# Patient Record
Sex: MALE | Race: WHITE
[De-identification: names, ages, dates, MRNs, and addresses within clinical notes are randomized per-mention and may not be internally consistent; named-entity substitution may affect disease eponyms.]

---

## 2018-07-17 ENCOUNTER — HOSPITAL ENCOUNTER (INPATIENT)
Dept: HOSPITAL 25 - ED | Age: 58
LOS: 7 days | Discharge: HOME | DRG: 880 | End: 2018-07-24
Attending: PSYCHIATRY & NEUROLOGY | Admitting: PSYCHIATRY & NEUROLOGY
Payer: MEDICARE

## 2018-07-17 DIAGNOSIS — R45.851: ICD-10-CM

## 2018-07-17 DIAGNOSIS — M10.9: ICD-10-CM

## 2018-07-17 DIAGNOSIS — F17.200: ICD-10-CM

## 2018-07-17 DIAGNOSIS — Z63.4: ICD-10-CM

## 2018-07-17 DIAGNOSIS — F60.3: ICD-10-CM

## 2018-07-17 DIAGNOSIS — Z56.0: ICD-10-CM

## 2018-07-17 DIAGNOSIS — F32.9: ICD-10-CM

## 2018-07-17 DIAGNOSIS — Z81.8: ICD-10-CM

## 2018-07-17 DIAGNOSIS — Z91.5: ICD-10-CM

## 2018-07-17 DIAGNOSIS — F41.9: Primary | ICD-10-CM

## 2018-07-17 DIAGNOSIS — F11.23: ICD-10-CM

## 2018-07-17 DIAGNOSIS — R82.79: ICD-10-CM

## 2018-07-17 DIAGNOSIS — F39: ICD-10-CM

## 2018-07-17 DIAGNOSIS — Z72.89: ICD-10-CM

## 2018-07-17 DIAGNOSIS — R82.6: ICD-10-CM

## 2018-07-17 LAB
BASOPHILS # BLD AUTO: 0 10^3/UL (ref 0–0.2)
EOSINOPHIL # BLD AUTO: 0.2 10^3/UL (ref 0–0.6)
HCT VFR BLD AUTO: 34 % (ref 42–52)
HGB BLD-MCNC: 11.8 G/DL (ref 14–18)
LYMPHOCYTES # BLD AUTO: 1.1 10^3/UL (ref 1–4.8)
MCH RBC QN AUTO: 33 PG (ref 27–31)
MCHC RBC AUTO-ENTMCNC: 35 G/DL (ref 31–36)
MCV RBC AUTO: 95 FL (ref 80–94)
MONOCYTES # BLD AUTO: 0.4 10^3/UL (ref 0–0.8)
NEUTROPHILS # BLD AUTO: 2.6 10^3/UL (ref 1.5–7.7)
NRBC # BLD AUTO: 0 10^3/UL
NRBC BLD QL AUTO: 0
PLATELET # BLD AUTO: 200 10^3/UL (ref 150–450)
RBC # BLD AUTO: 3.59 10^6/UL (ref 4–5.4)
WBC # BLD AUTO: 4.3 10^3/UL (ref 3.5–10.8)

## 2018-07-17 PROCEDURE — 99238 HOSP IP/OBS DSCHRG MGMT 30/<: CPT

## 2018-07-17 PROCEDURE — 81015 MICROSCOPIC EXAM OF URINE: CPT

## 2018-07-17 PROCEDURE — 90853 GROUP PSYCHOTHERAPY: CPT

## 2018-07-17 PROCEDURE — 87186 SC STD MICRODIL/AGAR DIL: CPT

## 2018-07-17 PROCEDURE — 85025 COMPLETE CBC W/AUTO DIFF WBC: CPT

## 2018-07-17 PROCEDURE — 99231 SBSQ HOSP IP/OBS SF/LOW 25: CPT

## 2018-07-17 PROCEDURE — 36415 COLL VENOUS BLD VENIPUNCTURE: CPT

## 2018-07-17 PROCEDURE — 80307 DRUG TEST PRSMV CHEM ANLYZR: CPT

## 2018-07-17 PROCEDURE — 99222 1ST HOSP IP/OBS MODERATE 55: CPT

## 2018-07-17 PROCEDURE — 80053 COMPREHEN METABOLIC PANEL: CPT

## 2018-07-17 PROCEDURE — 93005 ELECTROCARDIOGRAM TRACING: CPT

## 2018-07-17 PROCEDURE — 99284 EMERGENCY DEPT VISIT MOD MDM: CPT

## 2018-07-17 PROCEDURE — 83690 ASSAY OF LIPASE: CPT

## 2018-07-17 PROCEDURE — 76705 ECHO EXAM OF ABDOMEN: CPT

## 2018-07-17 PROCEDURE — 87077 CULTURE AEROBIC IDENTIFY: CPT

## 2018-07-17 PROCEDURE — 87086 URINE CULTURE/COLONY COUNT: CPT

## 2018-07-17 PROCEDURE — 81003 URINALYSIS AUTO W/O SCOPE: CPT

## 2018-07-17 SDOH — SOCIAL STABILITY - SOCIAL INSECURITY: DISSAPEARANCE AND DEATH OF FAMILY MEMBER: Z63.4

## 2018-07-17 SDOH — ECONOMIC STABILITY - INCOME SECURITY: UNEMPLOYMENT, UNSPECIFIED: Z56.0

## 2018-07-17 NOTE — ED
Progress





- Progress Note


Progress Note: 





Asked by mental health unit to eval pt for left foot numbness and to read EKG, 

ordered for pt to be transferred.


Pt seen and examined on flex unit.


Pt sitting upright in no distress, complains of chronic back pain, and 

occasional left leg numbness to his toes.  Pt has no problem ambulating.  

Declines pain medication.  Also denies chest pain, SOB, HA, dizziness, 

abdominal pain, fever or incontinence.





PE: Pt alert, calm, conversant, no distress, c/o mild numbness left foot.


HEENT: NC/AT


Neck: supple


Cor S1S2


Lungs: clear


Abd soft nontender


Extrem moves all extrem well, no edema,no calf tenderness.


Neuro: A O x 3, Motor 5/5, Sensation intact to light touch. Gait normal. No 

focal deficit.  Cranial nerves II-XII intact. Pt is able to walk on heels and 

toes.  Reflexes intact. 











- Consult/PCP


Time Called: 09:30





Course/Dx





- Course


Course Of Treatment: : 18: Pt c/o left foot numbness and chronic back 

pain.  Pt with intact neuro exam.  No concern for radiculopathy, or any red 

flags for back pain.  Pt declines pain medication.  EK:08, 18: SR 75, 

nl AVIVCT, nl QTc, nl axis, no acute changes, no prior to compare.  Pt stable 

to be transferred at this time, 18 0215am.  Care to Dr. Cross at change of 

shift.





Discharge





- Sign-Out/Discharge


Documenting (check all that apply): Sign-Out Patient, Receiving Sign-Out


Signing out patient TO: Anthony Cross - at 0215, 18, pending transfer


Receiving patient FROM: Sanford Elliott





- Discharge Plan


Referrals: 


Bull Myrick MD [Primary Care Provider] -

## 2018-07-17 NOTE — ED
Altered Mental Status





- HPI Summary


HPI Summary: 





This is scribkandice Cote documenting for attending physician Sanford Cunningham M.D. Pt is a 59 y/o M w/ c/o panic attack. He reports being up all night 

yesterday and feeling "wired" today. Pt felt similar this past weekend as well 

and notes that Sx have been coming and going. He states he could not calm 

himself down, that he felt a skipping in his chest, and that he could not take 

a full breath of air. Pt claims he felt that he wanted to run, scream, and "

jump out of his own skin" tonight. Calf and finger numbness is reported by Pt 

as well. He believes current housing situation is contributing to stress, still 

feels anxious, and he wants to talk to MHE. On triage, presence of pain, SI, 

and HI are denied. Hx of panic attacks is noted by Pt in room. 








- History Of Current Complaint


Chief Complaint: EDMentalHealth


Stated Complaint: ANXIETY


Time Seen by Provider: 07/17/18 00:49


Onset/Duration: Still Present


Timing: Lasting Days - onset "this past weekend"


Severity Currently: None - pain is denied


Character: Agitation


Aggravating Factor(s): Environmental Exposure - living situation


Alleviating Factor(s): Nothing





- Allergies/Home Medications


Allergies/Adverse Reactions: 


 Allergies











Allergy/AdvReac Type Severity Reaction Status Date / Time


 


No Known Allergies Allergy   Verified 02/25/13 20:46











Home Medications: 


 Home Medications





Oxycodone HCl [Oxycodone HCl ER 30 mg] 30 mg PO TID PRN 07/17/18 [History 

Confirmed 07/17/18]


Levothyroxine TAB* [Synthroid 25 MCG TAB*] 25 mcg PO DAILY 07/18/18 [History 

Confirmed 07/18/18]











PMH/Surg Hx/FS Hx/Imm Hx


Endocrine/Hematology History: 


   Denies: Hx Anticoagulant Therapy


Cardiovascular History: 


   Denies: Hx Pacemaker/ICD


Sensory History: 


   Denies: Hx Hearing Aid


Psychiatric History: 


   Denies: Hx Panic Disorder





- Surgical History


Surgery Procedure, Year, and Place: HERNIA REPAIR, FINGER REATTACHED, SLEEP 

APNEA SURGERY 1-2 YRS AGO


Infectious Disease History: No


Infectious Disease History: 


   Denies: Traveled Outside the [place] in Last 30 Days





- Family History


Known Family History: 


   Negative: Blood Disorder





- Social History


Alcohol Use: Occasionally


Substance Use Type: Reports: Marijuana


Substance Use Comment - Amount & Last Used: occassionally


Smoking Status (MU): Heavy Every Day Tobacco Smoker





Review of Systems


Positive: Other - "felt skipping in chest"


Positive: Other - "cannot take full breath" 


Neurological: Other - calf and finger numbness 


Positive: Anxious, Other - agitation


All Other Systems Reviewed And Are Negative: Yes





Physical Exam





- Summary


Physical Exam Summary: 





Appearance: Well-appearing, Well-nourished, lying in bed comfortably


Skin: Warm, dry, no obvious rash


Eyes: sclera anicteric, no conjunctival pallor


ENT: mucous membranes moist, pharynx appears normal


Neck: Supple, nontender


Respiratory: Clear to auscultation, no signs of respiratory distress


Cardiovascular: Normal S1, S2. No murmurs. Normal distal pulses in tibial and 

radial bilaterally.


Abdomen: Soft, nontender, normal active bowel sounds present


Musculoskeletal: Normal, Strength/ROM Intact


Neurological: A&Ox3, awake and alert, mentation is normal, speech is fluent and 

appropriate


Psychiatric: affect is normal, does appear anxious but not depressed





Triage Information Reviewed: Yes


Vital Signs On Initial Exam: 


 Initial Vitals











Temp Pulse Resp BP Pulse Ox


 


 98.7 F   90   20   142/103   99 


 


 07/17/18 00:20  07/17/18 00:20  07/17/18 00:20  07/17/18 00:20  07/17/18 00:20











Vital Signs Reviewed: Yes





Diagnostics





- Vital Signs


 Vital Signs











  Temp Pulse Resp BP Pulse Ox


 


 07/17/18 00:37   82    95


 


 07/17/18 00:31   87    100


 


 07/17/18 00:20  98.7 F  90  20  142/103  99














- Laboratory


Lab Results: 


 Lab Results











  07/17/18 07/17/18 Range/Units





  01:17 01:18 


 


WBC   4.3  (3.5-10.8)  10^3/ul


 


RBC   3.59 L  (4.00-5.40)  10^6/ul


 


Hgb   11.8 L  (14.0-18.0)  g/dl


 


Hct   34 L  (42-52)  %


 


MCV   95 H  (80-94)  fL


 


MCH   33 H  (27-31)  pg


 


MCHC   35  (31-36)  g/dl


 


RDW   13  (10.5-15)  %


 


Plt Count   200  (150-450)  10^3/ul


 


MPV   8.8  (7.4-10.4)  um3


 


Neut % (Auto)   59.1  (38-83)  %


 


Lymph % (Auto)   25.0  (25-47)  %


 


Mono % (Auto)   9.7 H  (0-7)  %


 


Eos % (Auto)   5.1  (0-6)  %


 


Baso % (Auto)   1.1  (0-2)  %


 


Absolute Neuts (auto)   2.6  (1.5-7.7)  10^3/ul


 


Absolute Lymphs (auto)   1.1  (1.0-4.8)  10^3/ul


 


Absolute Monos (auto)   0.4  (0-0.8)  10^3/ul


 


Absolute Eos (auto)   0.2  (0-0.6)  10^3/ul


 


Absolute Basos (auto)   0  (0-0.2)  10^3/ul


 


Absolute Nucleated RBC   0  10^3/ul


 


Nucleated RBC %   0  


 


Sodium  140   (135-145)  mmol/L


 


Potassium  3.1 L   (3.5-5.0)  mmol/L


 


Chloride  104   (101-111)  mmol/L


 


Carbon Dioxide  29   (22-32)  mmol/L


 


Anion Gap  7   (2-11)  mmol/L


 


BUN  8   (6-24)  mg/dL


 


Creatinine  0.82   (0.67-1.17)  mg/dL


 


Est GFR ( Amer)  117.6   (>60)  


 


Est GFR (Non-Af Amer)  97.2   (>60)  


 


BUN/Creatinine Ratio  9.8   (8-20)  


 


Glucose  115 H   ()  mg/dL


 


Calcium  8.7   (8.6-10.3)  mg/dL


 


Total Bilirubin  0.30   (0.2-1.0)  mg/dL


 


AST  104 H   (13-39)  U/L


 


ALT  53 H   (7-52)  U/L


 


Alkaline Phosphatase  22 L   ()  U/L


 


Total Protein  6.1 L   (6.4-8.9)  g/dL


 


Albumin  3.8   (3.2-5.2)  g/dL


 


Globulin  2.3   (2-4)  g/dL


 


Albumin/Globulin Ratio  1.7   (1-3)  











Result Diagrams: 


 07/17/18 01:18





 07/17/18 01:17


Lab Statement: Any lab studies that have been ordered have been reviewed, and 

results considered in the medical decision making process.





Altered Mental Statu Course/Dx





- Diagnoses


Provider Diagnoses: 


 Anxiety








Discharge





- Sign-Out/Discharge


Documenting (check all that apply): Patient Departure





- Discharge Plan


Condition: Fair


Disposition: HOME





- Billing Disposition and Condition


Condition: FAIR


Disposition: Home

## 2018-07-17 NOTE — PN
ED Flex Patient Progress Note


Subjective:  


This is a 58 year-old M who is here voluntarily and pending  psychiatric 

evaluation secondary to _____ excess energy/anxiety recently__________.


Reports RUQ pain past 3 days. Denies associated nausea, vomiting, diarrhea and 

no pain w/ eating. LFT's are elevated (AST > ALT). Denies heavy ETOH use and no 

h/o liver/gallbladder issues. Does have h/o gout since 23 y.o.





 





Objective: 


Vitals: Most recent vital signs documented below. 


General: NAD, Alert and oriented x3


HEENT: mucosa moist, sclera anicteric


Heart: S1/S2, rrr


Lungs: CTA or with rales, rhonchi, wheezing


AB: + BS, soft, RUQ TTP - no rebounding


Integ: anicteric


PSYCH: calm, pleasant, cooperative


Laboratory: Current laboratory results documented below. 


 








Assessment: 


1) Increased energy/anxiety


2) RUQ pain w/ elevated LFT's


 








Plan: 


1) Pending voluntary psychiatric evaluation. Will follow up daily __while in ED_

__. 


2) U/S and U/A ordered - pt agrees w/ plan





 Vital Signs











Temp Pulse Resp BP Pulse Ox


 


 98.7 F   84   20   142/103   94 


 


 07/17/18 00:20  07/17/18 01:00  07/17/18 00:20  07/17/18 00:20  07/17/18 01:00











 Lab Results - Entire Visit











  07/17/18 07/17/18





  01:18 01:17


 


WBC  4.3 


 


RBC  3.59 L 


 


Hgb  11.8 L 


 


Hct  34 L 


 


MCV  95 H 


 


MCH  33 H 


 


MCHC  35 


 


RDW  13 


 


Plt Count  200 


 


MPV  8.8 


 


Neut % (Auto)  59.1 


 


Lymph % (Auto)  25.0 


 


Mono % (Auto)  9.7 H 


 


Eos % (Auto)  5.1 


 


Baso % (Auto)  1.1 


 


Absolute Neuts (auto)  2.6 


 


Absolute Lymphs (auto)  1.1 


 


Absolute Monos (auto)  0.4 


 


Absolute Eos (auto)  0.2 


 


Absolute Basos (auto)  0 


 


Absolute Nucleated RBC  0 


 


Nucleated RBC %  0 


 


Sodium   140


 


Potassium   3.1 L


 


Chloride   104


 


Carbon Dioxide   29


 


Anion Gap   7


 


BUN   8


 


Creatinine   0.82


 


Est GFR ( Amer)   117.6


 


Est GFR (Non-Af Amer)   97.2


 


BUN/Creatinine Ratio   9.8


 


Glucose   115 H


 


Calcium   8.7


 


Total Bilirubin   0.30


 


AST   104 H


 


ALT   53 H


 


Alkaline Phosphatase   22 L


 


Total Protein   6.1 L


 


Albumin   3.8


 


Globulin   2.3


 


Albumin/Globulin Ratio   1.7

## 2018-07-17 NOTE — RAD
Indication: Right upper quadrant pain, elevated liver enzymes.



Real-time sonography of the right upper quadrant was performed.



The liver is is enlarged measuring up to 20.3 cm in length. No focal lesions or

intrahepatic duct dilatation is noted. The common duct measures up to 0.9 cm in the

extrahepatic and extra pancreatic duct. This is borderline abnormal. The distal common

duct is not visualized. The gallbladder demonstrates no gallstones, pericholecystic fluid

or wall thickening. The pancreas demonstrates no focal mass or pancreatic duct dilatation.



The right kidney measures 12.7 x 5.6 x 5.6 cm without hydronephrosis. Aorta is normal in

size. Inferior vena cava is unremarkable.



IMPRESSION: No evidence of cholelithiasis. The common duct measures up to 0.9 cm which is

borderline abnormal. No intrahepatic duct dictation is noted. Enlarged liver.

## 2018-07-18 LAB — WBC UR QL AUTO: (no result)

## 2018-07-18 RX ADMIN — LEVOTHYROXINE SODIUM SCH MCG: 25 TABLET ORAL at 14:48

## 2018-07-18 RX ADMIN — ALLOPURINOL SCH MG: 100 TABLET ORAL at 21:20

## 2018-07-18 RX ADMIN — WATER SCH: 100 INJECTION, SOLUTION INTRAVENOUS at 21:21

## 2018-07-18 NOTE — PN
Subjective





- Subjective


Service Type: 97705 Hosp care 15 min low complexity


Subjective: 


patient continues to endorse desire for admission to [facility].  he states he "needs 

help" and that he had a positive experience when here 18 years ago.  writer 

notified him of likely bed availability at [facility] [place] [facility] later today. 





Objective





- Appearance


Appearance: Well Developed/Nourished


Dysmorphic Features: Yes


Hygiene: Normal


Grooming: Fairly Well Kept





- Behavior


Psychomotor Activities: Normal


Exhibits Abnormal Movement: No





- Attitude and Relatedness


Attitude and Relatedness: Cooperative


Eye Contact: Good





- Speech


Quality: Unpressured


Latencies: Normal


Quantity: Copious





- Mood


Patient's Decription of Mood: "Anxious"





- Affect


Observed Affect: Tense


Affect Consistent with: Dysphoria





- Thought Process


Patient's Thought Process: Coherent, Over Inclusive


Thought Content: Yes Passive Death Wish, Yes Suicidal Planning, No Homicidal 

Ideation, No Paranoid Ideation





- Sensorium


Experiencing Hallucinations: No, Sensorium is Clear


Type of Hallucinations: Visual: No, Auditory: No, Command: No





- Level of Consciousness


Level of Consciousness: Alert


Orientation: Yes Intact, Yes Orientated to Time, Yes Orientated to Place, Yes 

Orientated to Person





- Impulse Control


Impulse Control: Tenuous





- Insight and Judgement


Insight and Judgement: Poor





Assessment





- Assessment


Inpatient DSM-V Dx: F41.9


Clinical Impression: 





59yo white male, domiciled, who presented to ED with c/o panic and suicidal 

ideation.  He reports desire to stop opioid pain medications and requests 

admission to [facility].  





Plan





- Plan


Treatment Plan: 


Name: MERON CARUSO                        


YOB: 1960                        


[id number]


[id number]





voluntary admission to [facility] pending bed availability.

## 2018-07-18 NOTE — PN
Subjective





- Subjective


Date of Service: 07/17/18


Service Type: 82164 Hosp care 15 min low complexity


Subjective: 


patient continues to endorse anxiety and suicidal ideation.  he understands the 

mental health unit is full and staff is attempting to identify an accepting 

hospital. 





Objective





- Appearance


Appearance: Well Developed/Nourished


Dysmorphic Features: Yes


Hygiene: Normal


Grooming: Fairly Well Kept





- Behavior


Psychomotor Activities: Normal


Exhibits Abnormal Movement: No





- Attitude and Relatedness


Attitude and Relatedness: Cooperative


Eye Contact: Fair





- Speech


Quality: Unpressured


Latencies: Normal


Quantity: Appropriate





- Mood


Patient's Decription of Mood: "Sad"





- Affect


Observed Affect: Depressed


Affect Consistent with: Dysphoria





- Thought Process


Patient's Thought Process: Over Inclusive


Thought Content: Yes Passive Death Wish, Yes Suicidal Planning, No Homicidal 

Ideation, No Paranoid Ideation





- Sensorium


Experiencing Hallucinations: No, Sensorium is Clear


Type of Hallucinations: Visual: No, Auditory: No, Command: No





- Level of Consciousness


Level of Consciousness: Alert


Orientation: Yes Intact, Yes Orientated to Time, Yes Orientated to Place, Yes 

Orientated to Person





- Impulse Control


Impulse Control: Poor





- Insight and Judgement


Insight and Judgement: Poor





Assessment





- Assessment


Merits Inpatient Hospitalization: For Immediate Safety, For Stabilization, To 

Initiate Treatment


Inpatient DSM-V Dx: F41.9


Clinical Impression: 





57yo white male, domiciled, who presented to ED with c/o panic and suicidal 

ideation.  He reports desire to stop opioid pain medications and requests 

admission to [facility].  





Plan





- Plan


Treatment Plan: 


Name: MERON CARUSO                        


YOB: 1960                        


[id number]


[id number]





voluntary admission to [facility].  unit is full and pending transfer to accepting 

facility.

## 2018-07-18 NOTE — ED
Progress





- Progress Note


Progress Note: 





Asked by mental health unit to eval pt for left foot numbness and to read EKG, 

ordered for pt to be transferred.


Pt seen and examined on flex unit.


Pt sitting upright in no distress, complains of chronic back pain, and 

occasional left leg numbness to his toes.  Pt has no problem ambulating.  

Declines pain medication.  Also denies chest pain, SOB, HA, dizziness, 

abdominal pain, fever or incontinence.





PE: Pt alert, calm, conversant, no distress, c/o mild numbness left foot.


HEENT: NC/AT


Neck: supple


Cor S1S2


Lungs: clear


Abd soft nontender


Extrem moves all extrem well, no edema,no calf tenderness.


Neuro: A O x 3, Motor 5/5, Sensation intact to light touch. Gait normal. No 

focal deficit.  Cranial nerves II-XII intact. Pt is able to walk on heels and 

toes.  Reflexes intact. 











- Consult/PCP


Time Called: 09:30





Course/Dx





- Course


Course Of Treatment: 213: 18: Pt c/o left foot numbness and chronic back 

pain.  Pt with intact neuro exam.  No concern for radiculopathy, or any red 

flags for back pain.  Pt declines pain medication.  EK:08, 18: SR 75, 

nl AVIVCT, nl QTc, nl axis, no acute changes, no prior to compare.  Pt stable 

to be transferred at this time, 18 0215am.  Care to Dr. Cross at change of 

shift.





- Diagnoses


Provider Diagnoses: 


 Anxiety








Discharge





- Sign-Out/Discharge


Documenting (check all that apply): Patient Departure - admit





- Discharge Plan


Condition: Fair


Disposition: ADMITTED TO [facility]


Referrals: 


Bull Myrick MD [Primary Care Provider] - 





- Billing Disposition and Condition


Condition: FAIR


Disposition: Admitted to [facility]

## 2018-07-19 RX ADMIN — THERA TABS SCH TAB: TAB at 08:52

## 2018-07-19 RX ADMIN — ALLOPURINOL SCH MG: 100 TABLET ORAL at 08:53

## 2018-07-19 RX ADMIN — WATER SCH: 100 INJECTION, SOLUTION INTRAVENOUS at 19:35

## 2018-07-19 RX ADMIN — ALLOPURINOL SCH MG: 100 TABLET ORAL at 21:39

## 2018-07-19 RX ADMIN — ACETAMINOPHEN PRN MG: 325 TABLET ORAL at 00:25

## 2018-07-19 RX ADMIN — LEVOTHYROXINE SODIUM SCH MCG: 25 TABLET ORAL at 07:21

## 2018-07-19 RX ADMIN — NICOTINE SCH: 21 PATCH TRANSDERMAL at 08:53

## 2018-07-19 NOTE — PN
MHU: Group Therapy Note





- Service Type


Service Type: 80058 Group Psychotherapy - Cognitive Behavioral Group Therapy (

CBT):Patient was attentive and participatory in CBT programming this morning, 

and remained in good behavioral control.  Patient expressed positive insights 

regarding relevant treatment interventions and goals.
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
## 2018-07-20 RX ADMIN — ALLOPURINOL SCH MG: 100 TABLET ORAL at 20:10

## 2018-07-20 RX ADMIN — LEVOTHYROXINE SODIUM SCH MCG: 25 TABLET ORAL at 08:45

## 2018-07-20 RX ADMIN — MIRTAZAPINE SCH MG: 15 TABLET, FILM COATED ORAL at 21:41

## 2018-07-20 RX ADMIN — IBUPROFEN PRN MG: 400 TABLET ORAL at 15:38

## 2018-07-20 RX ADMIN — BACITRACIN SCH: 500 OINTMENT TOPICAL at 20:09

## 2018-07-20 RX ADMIN — ALLOPURINOL SCH MG: 100 TABLET ORAL at 08:45

## 2018-07-20 RX ADMIN — WATER SCH: 100 INJECTION, SOLUTION INTRAVENOUS at 20:10

## 2018-07-20 RX ADMIN — NICOTINE SCH: 21 PATCH TRANSDERMAL at 08:45

## 2018-07-20 RX ADMIN — THERA TABS SCH TAB: TAB at 08:45

## 2018-07-20 RX ADMIN — ACETAMINOPHEN PRN MG: 325 TABLET ORAL at 08:59

## 2018-07-20 NOTE — PN
Subjective





- Subjective


Date of Service: 07/20/18


Service Type: [address] [facility] care 15 min low complexity


Subjective: 





Patient was seen by self, discussed with treatment team, chart was reviewed. 

Patient has been compliant with his medications, no reported side effects. 

Patient reported that finally he was mechelle to sleep some better last night, 

continue to report depressive symptoms but working with therapy for his 

symptoms. Patient sleeping has been better than last few days. Patient eating 

has been fair on the unit. Patient has been cooperative with staff. Patient 

behavior has been in control and participating in the milieu. Patient mood was 

less anxious reports some improvement in his feelings and negative thoughts. 

Patient has been reporting no suicidal or homicidal ideation on the unit. No 

psychotic symptoms of delusions or hallucinations.





Objective





- Appearance


Appearance: Healthy Appearing


Dysmorphic Features: No


Hygiene: Normal


Grooming: Fairly Well Kept





- Behavior


Psychomotor Activities: Normal


Exhibits Abnormal Movement: No





- Attitude and Relatedness


Attitude and Relatedness: Cooperative


Eye Contact: Fair





- Speech


Quality: Unpressured


Latencies: Normal


Quantity: Appropriate





- Mood


Patient's Decription of Mood: "better"





- Affect


Observed Affect: Fair


Affect Consistent with: Dysphoria - less





- Thought Process


Patient's Thought Process: Coherent


Thought Content: No Passive Death Wish, No Suicidal Planning, No Homicidal 

Ideation, No Paranoid Ideation





- Sensorium


Experiencing Hallucinations: No, Sensorium is Clear


Type of Hallucinations: Visual: No, Auditory: No, Command: No





- Level of Consciousness


Level of Consciousness: Alert


Orientation: Yes Intact, Yes Orientated to Time, Yes Orientated to Place, Yes 

Orientated to Person





- Impulse Control


Impulse Control: Intact





- Insight and Judgement


Insight and Judgement: Fair





- Group Participation


Particating in Group Activities: Yes





- Medication Management


Medication Management Adherence: Yes





Assessment





- Assessment


Inpatient DSM-V Dx: F41.9


Clinical Impression: 





Patient with history of mood disorder unspecified, substance/acohol use disorder

, borderline personality disorder. Patient currently admitted due to worsening 

of depression and suicidal ideation. Patient has also been struggling with 

relationship issues and feels lonely as recent losses of family and friends. 

Patient is a danger to self, discharged hence medications are being adjusted 

and symptoms will be stabilized on inpatient.





Plan





- Plan


Treatment Plan: 


Name: MERON CARUSO                        


YOB: 1960                        


[id number]


[id number]








- Patient continues to be hospitalized due to recent suicidal thoughts with plan

, mood instability, anxiety and impulsivity.


- Patient's medications were adjusted after informed consent with increment in 

Remeron to 15 mg HS. Will continue with Benadryl and clonidine prn for anxiety 

and opioid withdrawal respectively.


- Patient will be monitored for improvement and side effects. Risk and benefits 

were discussed.


- Patient was encouraged to continue his participation in the milieu, group and 

individual therapy


Medications: 


 Current Medications





Al Hydrox/Mg Hydrox/Simethicone (Maalox Plus*)  30 ml PO Q4H PRN


   PRN Reason: INDIGESTION


Allopurinol (Zyloprim Tab*)  100 mg PO BID [facility]


   Last Admin: 07/20/18 08:45 Dose:  100 mg


Clonidine HCl (Catapres Tab*)  0.1 mg PO Q6H PRN


   PRN Reason: WITHDRAWAL - OPIATE


Colchicine (Colcrys*)  0.6 mg PO BID PRN


   PRN Reason: PAIN


Diphenhydramine HCl (Benadryl Po*)  25 mg PO Q4H PRN


   PRN Reason: ITCHING


   Last Admin: 07/19/18 00:25 Dose:  25 mg


Hydrocortisone (Hytone Cream 1%*)  1 applic TOPICAL TID PRN


   PRN Reason: ITCHING


   Last Admin: 07/18/18 21:22 Dose:  1 applic


Ibuprofen (Motrin Tab*)  400 mg PO Q6H PRN


   PRN Reason: PAIN


Levothyroxine Sodium (Synthroid Tab*)  25 mcg PO DAILY@0600 [facility]


   Last Admin: 07/20/18 08:45 Dose:  25 mcg


Mirtazapine (Remeron Tab*)  15 mg PO BEDTIME [facility]


Multivitamins (Theragran Tab*)  1 tab PO DAILY [facility]


   Last Admin: 07/20/18 08:45 Dose:  1 tab


Nicotine (Nicotine Inhaler*)  10 mg INH Q2H PRN


   PRN Reason: CRAVING


Nicotine (Nicotine Patch 21 Mg/24 Hr*)  1 patch TRANSDERM DAILY@0800 [facility]


   Last Admin: 07/20/18 08:45 Dose:  Not Given


Nicotine Polacrilex (Nicotine Gum*)  2 mg PO Q2H PRN


   PRN Reason: CRAVING


Pharmacy Profile Note (Nicotine Patch Removal Note*)  1 note PATCH OFF 2100 [facility]


   Last Admin: 07/19/18 19:35 Dose:  Not Given

## 2018-07-21 RX ADMIN — WATER SCH: 100 INJECTION, SOLUTION INTRAVENOUS at 20:27

## 2018-07-21 RX ADMIN — BACITRACIN SCH: 500 OINTMENT TOPICAL at 21:19

## 2018-07-21 RX ADMIN — IBUPROFEN PRN MG: 400 TABLET ORAL at 08:33

## 2018-07-21 RX ADMIN — THERA TABS SCH TAB: TAB at 08:32

## 2018-07-21 RX ADMIN — IBUPROFEN PRN MG: 400 TABLET ORAL at 21:19

## 2018-07-21 RX ADMIN — NICOTINE SCH: 21 PATCH TRANSDERMAL at 08:33

## 2018-07-21 RX ADMIN — BACITRACIN SCH: 500 OINTMENT TOPICAL at 08:33

## 2018-07-21 RX ADMIN — ALLOPURINOL SCH MG: 100 TABLET ORAL at 21:17

## 2018-07-21 RX ADMIN — LEVOTHYROXINE SODIUM SCH MCG: 25 TABLET ORAL at 07:39

## 2018-07-21 RX ADMIN — MIRTAZAPINE SCH MG: 15 TABLET, FILM COATED ORAL at 21:18

## 2018-07-21 RX ADMIN — ALLOPURINOL SCH MG: 100 TABLET ORAL at 08:32

## 2018-07-22 RX ADMIN — MIRTAZAPINE SCH MG: 15 TABLET, FILM COATED ORAL at 21:03

## 2018-07-22 RX ADMIN — NICOTINE SCH: 21 PATCH TRANSDERMAL at 08:08

## 2018-07-22 RX ADMIN — LEVOTHYROXINE SODIUM SCH MCG: 25 TABLET ORAL at 07:47

## 2018-07-22 RX ADMIN — BACITRACIN SCH: 500 OINTMENT TOPICAL at 09:27

## 2018-07-22 RX ADMIN — ALLOPURINOL SCH MG: 100 TABLET ORAL at 21:03

## 2018-07-22 RX ADMIN — ALLOPURINOL SCH MG: 100 TABLET ORAL at 09:27

## 2018-07-22 RX ADMIN — BACITRACIN SCH: 500 OINTMENT TOPICAL at 21:04

## 2018-07-22 RX ADMIN — WATER SCH: 100 INJECTION, SOLUTION INTRAVENOUS at 19:38

## 2018-07-22 RX ADMIN — THERA TABS SCH TAB: TAB at 09:27

## 2018-07-22 NOTE — PN
Subjective





- Subjective


Date of Service: 07/22/18


Service Type: 41801 [facility]osp care 15 min low complexity


Subjective: 





Armani reports that overall he has been doing better with improved mood, less 

anxiety, no thoughts of self harm or harm to others. Denies psychotic symptoms. 

Sleeping better last couple of days. Tolerating current meds well. Engaged in 

groups and other unit routines.





Objective





- Appearance


Appearance: Healthy Appearing, Obese


Dysmorphic Features: No


Hygiene: Normal


Grooming: Well Kept





- Behavior


Psychomotor Activities: Normal


Exhibits Abnormal Movement: No





- Attitude and Relatedness


Attitude and Relatedness: Appropriate


Eye Contact: Good





- Speech


Quality: Unpressured


Latencies: Normal


Quantity: Appropriate





- Mood


Patient's Decription of Mood: "Good"





- Affect


Observed Affect: Non-labile


Affect Consistent with: Euthymia





- Thought Process


Patient's Thought Process: Coherent, Goal Directed


Thought Content: No Passive Death Wish, No Suicidal Planning, No Homicidal 

Ideation, No Paranoid Ideation





- Sensorium


Experiencing Hallucinations: No, Sensorium is Clear


Type of Hallucinations: Visual: No, Auditory: No, Command: No





- Level of Consciousness


Level of Consciousness: Alert


Orientation: Yes Intact, Yes Orientated to Time, Yes Orientated to Place, Yes 

Orientated to Person





- Impulse Control


Impulse Control: Intact





- Insight and Judgement


Insight and Judgement: Good





- Group Participation


Particating in Group Activities: Yes





- Medication Management


Medication Management Adherence: Yes





Assessment





- Assessment


Merits Inpatient Hospitalization: Consolidate Improvements, For Discharge 

Planning


Inpatient DSM-V Dx: F41.9


Clinical Impression: 





Improved significantly and may be discharged soon.





Plan





- Plan


Treatment Plan: 


Name: ARMANI CARUSO                        


YOB: 1960                        


[id number]


[id number]











Continued Medication Management: Continue Outpt Medication


Medications: 


 Current Medications





Al Hydrox/Mg Hydrox/Simethicone (Maalox Plus*)  30 ml PO Q4H PRN


   PRN Reason: INDIGESTION


Allopurinol (Zyloprim Tab*)  100 mg PO BID [facility]


   Last Admin: 07/22/18 09:27 Dose:  100 mg


Bacitracin (Bacitracin Ointment*)  1 applic TOPICAL BID [facility]


   Last Admin: 07/22/18 09:27 Dose:  Not Given


Clonidine HCl (Catapres Tab*)  0.1 mg PO Q6H PRN


   PRN Reason: WITHDRAWAL - OPIATE


Colchicine (Colcrys*)  0.6 mg PO BID PRN


   PRN Reason: PAIN


Diphenhydramine HCl (Benadryl Po*)  25 mg PO Q4H PRN


   PRN Reason: ITCHING


   Last Admin: 07/19/18 00:25 Dose:  25 mg


Hydrocortisone (Hytone Cream 1%*)  1 applic TOPICAL TID PRN


   PRN Reason: ITCHING


   Last Admin: 07/18/18 21:22 Dose:  1 applic


Ibuprofen (Motrin Tab*)  400 mg PO Q6H PRN


   PRN Reason: PAIN


   Last Admin: 07/21/18 21:19 Dose:  400 mg


Levothyroxine Sodium (Synthroid Tab*)  25 mcg PO DAILY@0600 [facility]


   Last Admin: 07/22/18 07:47 Dose:  25 mcg


Mirtazapine (Remeron Tab*)  15 mg PO BEDTIME [facility]


   Last Admin: 07/21/18 21:18 Dose:  15 mg


Multivitamins (Theragran Tab*)  1 tab PO DAILY [facility]


   Last Admin: 07/22/18 09:27 Dose:  1 tab


Nicotine (Nicotine Inhaler*)  10 mg INH Q2H PRN


   PRN Reason: CRAVING


Nicotine (Nicotine Patch 21 Mg/24 Hr*)  1 patch TRANSDERM DAILY@0800 [facility]


   Last Admin: 07/22/18 08:08 Dose:  Not Given


Nicotine Polacrilex (Nicotine Gum*)  2 mg PO Q2H PRN


   PRN Reason: CRAVING


Pharmacy Profile Note (Nicotine Patch Removal Note*)  1 note PATCH OFF 2100 [facility]


   Last Admin: 07/21/18 20:27 Dose:  Not Given











- Discharge Plan


Discharge Plan: Outpatient Follow Up


Outpatient Program: [facility]

## 2018-07-23 PROCEDURE — GZHZZZZ GROUP PSYCHOTHERAPY: ICD-10-PCS

## 2018-07-23 RX ADMIN — LEVOTHYROXINE SODIUM SCH MCG: 25 TABLET ORAL at 07:55

## 2018-07-23 RX ADMIN — ALLOPURINOL SCH MG: 100 TABLET ORAL at 09:27

## 2018-07-23 RX ADMIN — BACITRACIN SCH: 500 OINTMENT TOPICAL at 21:47

## 2018-07-23 RX ADMIN — THERA TABS SCH TAB: TAB at 09:27

## 2018-07-23 RX ADMIN — ALLOPURINOL SCH MG: 100 TABLET ORAL at 21:46

## 2018-07-23 RX ADMIN — BACITRACIN SCH: 500 OINTMENT TOPICAL at 09:28

## 2018-07-23 RX ADMIN — MIRTAZAPINE SCH MG: 15 TABLET, FILM COATED ORAL at 21:46

## 2018-07-23 RX ADMIN — NICOTINE SCH: 21 PATCH TRANSDERMAL at 09:27

## 2018-07-23 RX ADMIN — WATER SCH: 100 INJECTION, SOLUTION INTRAVENOUS at 21:47

## 2018-07-23 NOTE — PN
[facility]: Group Therapy Note





- Service Type


Service Type: 57171 Group Psychotherapy - Cognitive Behavioral Group Therapy (

CBT):Patient was attentive and participatory in CBT programming this morning, 

and remained in good behavioral control.  Patient expressed positive insights 

regarding relevant treatment interventions and goals.
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
## 2018-07-24 VITALS — DIASTOLIC BLOOD PRESSURE: 74 MMHG | SYSTOLIC BLOOD PRESSURE: 118 MMHG

## 2018-07-24 RX ADMIN — LEVOTHYROXINE SODIUM SCH MCG: 25 TABLET ORAL at 08:02

## 2018-07-24 RX ADMIN — BACITRACIN SCH: 500 OINTMENT TOPICAL at 08:02

## 2018-07-24 RX ADMIN — NICOTINE SCH: 21 PATCH TRANSDERMAL at 08:02

## 2018-07-24 RX ADMIN — ALLOPURINOL SCH MG: 100 TABLET ORAL at 08:02

## 2018-07-24 RX ADMIN — THERA TABS SCH TAB: TAB at 08:02
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
## 2019-03-05 ENCOUNTER — HOSPITAL ENCOUNTER (INPATIENT)
Dept: HOSPITAL 25 - OR | Age: 59
LOS: 3 days | Discharge: HOME | DRG: 520 | End: 2019-03-08
Attending: NEUROLOGICAL SURGERY | Admitting: NEUROLOGICAL SURGERY
Payer: MEDICARE

## 2019-03-05 DIAGNOSIS — M51.26: ICD-10-CM

## 2019-03-05 DIAGNOSIS — R09.82: ICD-10-CM

## 2019-03-05 DIAGNOSIS — Z82.49: ICD-10-CM

## 2019-03-05 DIAGNOSIS — F32.9: ICD-10-CM

## 2019-03-05 DIAGNOSIS — E66.9: ICD-10-CM

## 2019-03-05 DIAGNOSIS — M19.90: ICD-10-CM

## 2019-03-05 DIAGNOSIS — H93.13: ICD-10-CM

## 2019-03-05 DIAGNOSIS — M48.062: Primary | ICD-10-CM

## 2019-03-05 DIAGNOSIS — E78.5: ICD-10-CM

## 2019-03-05 DIAGNOSIS — G47.33: ICD-10-CM

## 2019-03-05 DIAGNOSIS — E03.9: ICD-10-CM

## 2019-03-05 DIAGNOSIS — E29.1: ICD-10-CM

## 2019-03-05 DIAGNOSIS — F41.9: ICD-10-CM

## 2019-03-05 DIAGNOSIS — F17.210: ICD-10-CM

## 2019-03-05 DIAGNOSIS — Z86.010: ICD-10-CM

## 2019-03-05 DIAGNOSIS — F60.3: ICD-10-CM

## 2019-03-05 DIAGNOSIS — M10.09: ICD-10-CM

## 2019-03-05 DIAGNOSIS — Z72.89: ICD-10-CM

## 2019-03-05 PROCEDURE — G0378 HOSPITAL OBSERVATION PER HR: HCPCS

## 2019-03-05 PROCEDURE — 72100 X-RAY EXAM L-S SPINE 2/3 VWS: CPT

## 2019-03-05 PROCEDURE — 0SB20ZZ EXCISION OF LUMBAR VERTEBRAL DISC, OPEN APPROACH: ICD-10-PCS | Performed by: NEUROLOGICAL SURGERY

## 2019-03-05 PROCEDURE — 01NB0ZZ RELEASE LUMBAR NERVE, OPEN APPROACH: ICD-10-PCS | Performed by: NEUROLOGICAL SURGERY

## 2019-03-05 RX ADMIN — NICOTINE SCH: 21 PATCH TRANSDERMAL at 12:37

## 2019-03-05 RX ADMIN — HYDROMORPHONE HYDROCHLORIDE PRN MG: 1 INJECTION, SOLUTION INTRAMUSCULAR; INTRAVENOUS; SUBCUTANEOUS at 10:18

## 2019-03-05 RX ADMIN — WATER SCH: 100 INJECTION, SOLUTION INTRAVENOUS at 20:34

## 2019-03-05 RX ADMIN — DIPHENHYDRAMINE HYDROCHLORIDE PRN MG: 25 CAPSULE ORAL at 15:09

## 2019-03-05 RX ADMIN — HYDROCODONE BITARTRATE AND ACETAMINOPHEN PRN TAB: 5; 325 TABLET ORAL at 16:26

## 2019-03-05 RX ADMIN — HYDROMORPHONE HYDROCHLORIDE PRN MG: 1 INJECTION, SOLUTION INTRAMUSCULAR; INTRAVENOUS; SUBCUTANEOUS at 10:28

## 2019-03-05 RX ADMIN — HYDROCODONE BITARTRATE AND ACETAMINOPHEN PRN TAB: 5; 325 TABLET ORAL at 20:32

## 2019-03-06 RX ADMIN — OXYCODONE HYDROCHLORIDE AND ACETAMINOPHEN PRN TAB: 5; 325 TABLET ORAL at 10:43

## 2019-03-06 RX ADMIN — LEVOTHYROXINE SODIUM SCH: 25 TABLET ORAL at 05:44

## 2019-03-06 RX ADMIN — WATER SCH NOTE: 100 INJECTION, SOLUTION INTRAVENOUS at 21:45

## 2019-03-06 RX ADMIN — ALLOPURINOL SCH: 100 TABLET ORAL at 07:30

## 2019-03-06 RX ADMIN — HYDROCODONE BITARTRATE AND ACETAMINOPHEN PRN TAB: 5; 325 TABLET ORAL at 05:43

## 2019-03-06 RX ADMIN — OXYCODONE HYDROCHLORIDE AND ACETAMINOPHEN PRN TAB: 5; 325 TABLET ORAL at 21:45

## 2019-03-06 RX ADMIN — OXYCODONE HYDROCHLORIDE AND ACETAMINOPHEN PRN TAB: 5; 325 TABLET ORAL at 15:40

## 2019-03-06 RX ADMIN — MAGNESIUM HYDROXIDE PRN ML: 400 SUSPENSION ORAL at 21:50

## 2019-03-06 RX ADMIN — NICOTINE SCH: 21 PATCH TRANSDERMAL at 09:57

## 2019-03-06 RX ADMIN — HYDROCODONE BITARTRATE AND ACETAMINOPHEN PRN TAB: 5; 325 TABLET ORAL at 00:40

## 2019-03-06 NOTE — PN
Progress Note





- Progress Note


Date of Service: 03/06/19


SOAP: 


Subjective:


[S/p decompressive lumbar laminectomy and discectomy, POD #1


Feeling well this morning


Mild persistent left anterior thigh and groin pain


Ambulating well independently


Eating and drinking well


]








Objective:


[





Vital Signs:








Temp Pulse Resp BP Pulse Ox


 


 97.8 F   87   18   135/82   96 


 


 03/06/19 05:07  03/06/19 05:07  03/06/19 05:43  03/06/19 05:07  03/06/19 05:07








General: Alert, sitting up in bed, NAD


Neuro: Motor and sensory intact


Incision: Drain in place and functioning well


 Wound drain output








  03/05/19 03/05/19 03/05/19





  11:00 12:35 14:26


 


Output, SARA #1 70 90 90














  03/05/19 03/05/19 03/05/19





  19:21 19:49 22:10


 


Output, SARA #1 20 20 40














  03/06/19 03/06/19 03/06/19





  00:20 05:09 07:00


 


Output, SARA #1 20 60 30











]








Assessment:


[Satisfactory post-op. Wound drain requires further monitoring]








Plan:


[1. Admit to inpatient


2. Continue to monitor drain output Q4H


3. Encourage OOB and ambulation]

## 2019-03-06 NOTE — OP
DATE OF OPERATION:  03/05/19 - ROOM #333

 

DATE OF BIRTH:  [birth year]

 

PRIMARY SURGEON:  Ariel Meade MD

 

FIRST ASSISTANT:  ELIAN Castro

 

ANESTHESIA:  General.

 

PRE-OP DIAGNOSES:  Lumbar spinal stenosis L2-3, L3-4, herniated nucleus 
pulposus L2- 3 on the left.

 

POST-OP DIAGNOSES:  Lumbar spinal stenosis L2-3, L3-4, herniated nucleus 
pulposus L2-3 on the left.

 

OPERATIVE PROCEDURE:  Decompressive lumbar laminectomy L2-3, L3-4 with 
foraminotomies L2-3, L3-4, excision of herniated disk L2-3 on the left with 
microdissection.

 

DESCRIPTION OF PROCEDURE:  After satisfactory general anesthesia was obtained, 
the patient was placed on the operating room table in the prone position with 
the chest supported on the Francisco frame with the back slightly flexed.  The 
lumbar region was then clipped, prepped and draped in a sterile manner for 
lumbar laminectomy and a skin incision outlined from L2 to L4.  This incision 
was infiltrated with 1% Xylocaine with epinephrine, after which it was turned 
in sharply to the level of the lumbar fascia.  The fascia was divided along the 
spinous processes from L2 to L4 and the paraspinal musculature was stripped 
away from these posterior elements bilaterally utilizing the monopolar cautery.
  Self-retraining retractors were placed to facilitate exposure.  An 
intraoperative x-ray was obtained verifying proper interspace localization.  
The initial decompression was carried out at the L2-3 level.  The spinous 
process of L2 and the spinous process of L3 were removed with a combination of 
the Kimberly rib shear and Leksell rongeurs.  The remaining portion of the base 
of the spinous process of L2 as well as the inferior aspect of the lamina of L2 
as well as the medial aspect of the hypertrophied facet complex was thinned out 
with the Midas Fei drill.  A decompression was then carried out superiorly 
until the attachment of ligamentum flavum was taken down.  Ligamentum flavum 
was then removed with the Kerrison.  The decompression was carried out 
laterally and inferiorly until both L3 nerve roots noted to be free in their 
course.  Attention was then directed to the L3-4 level where a similar 
decompression was carried out.  The finding at this level was a combination of 
facet and ligamentous hypertrophy contributing to lateral recess stenosis. The 
decompression was carried out laterally and inferiorly until generous 
foraminotomies had been carried out of both L4 nerve roots.  At this point of 
procedure, the operating microscope was brought into the field.  Its 
preoperative imaging has suggested a superiorly migrated disk herniation at L2-
3 on the left side.  Utilizing microdissection, epidural venous structures were 
coagulated and divided.  Projecting into the foraminal region, compressing the 
L3 nerve root with multiple fragments of freely extruded disk material. These 
were teased out and dissected free and removed with pituitary rongeurs. 
Hemostasis was obtained with the bipolar forceps as well as temporary Gelfoam.  
After assuring adequate hemostasis, wound was thoroughly irrigated after which 
a drain was placed in the epidural space and tunneled out toward the left side.
  The fascia was then reapproximated with 0 Vicryl suture.  The subcutaneous 
tissues closed with 3-0 Vicryl suture and the skin closed with skin clips.  The 
estimated blood loss was 150 cc.  The final sponge, padding, and needle counts 
were correct.  The patient was taken to the recovery room, extubated, and in 
stable condition.

 

 [id number]/CPS #: [id number]

SYED

## 2019-03-07 RX ADMIN — DIPHENHYDRAMINE HYDROCHLORIDE PRN MG: 25 CAPSULE ORAL at 21:34

## 2019-03-07 RX ADMIN — NICOTINE SCH: 21 PATCH TRANSDERMAL at 08:53

## 2019-03-07 RX ADMIN — WATER SCH: 100 INJECTION, SOLUTION INTRAVENOUS at 21:49

## 2019-03-07 RX ADMIN — ALLOPURINOL SCH MG: 100 TABLET ORAL at 08:53

## 2019-03-07 RX ADMIN — OXYCODONE HYDROCHLORIDE AND ACETAMINOPHEN PRN TAB: 5; 325 TABLET ORAL at 08:53

## 2019-03-07 RX ADMIN — MAGNESIUM HYDROXIDE PRN ML: 400 SUSPENSION ORAL at 11:21

## 2019-03-07 RX ADMIN — OXYCODONE HYDROCHLORIDE AND ACETAMINOPHEN PRN TAB: 5; 325 TABLET ORAL at 18:15

## 2019-03-07 RX ADMIN — OXYCODONE HYDROCHLORIDE AND ACETAMINOPHEN PRN TAB: 5; 325 TABLET ORAL at 14:39

## 2019-03-07 RX ADMIN — LEVOTHYROXINE SODIUM SCH MCG: 25 TABLET ORAL at 06:21

## 2019-03-07 NOTE — PN
Progress Note





- Progress Note


Date of Service: 03/07/19


SOAP: 


Subjective:


[Pt seen this morning at 0730.


S/p decompressive lumbar laminectomy and discectomy, POD #2.


Reports mild persistent pain in the lower extremity


Ambulating independently around the unit


Pain management with PO meds


Denies nausea, headache


]








Objective:


[


Vital Signs:











Temp Pulse Resp BP Pulse Ox


 


 97.5 F   94   16   152/80   98 


 


 03/07/19 15:25  03/07/19 15:25  03/07/19 15:25  03/07/19 15:25  03/07/19 11:26








General: Alert, sitting up in bed, NAD


Neuro: Motor and sensory intact


Incision: Intact, drain in place and functioning well


 Wound drain output








  03/05/19 03/05/19 03/05/19





  11:00 12:35 14:26


 


Output, SARA #1 70 90 90














  03/05/19 03/05/19 03/05/19





  19:21 19:49 22:10


 


Output, SARA #1 20 20 40














  03/06/19 03/06/19 03/06/19





  00:20 05:09 07:00


 


Output, SARA #1 20 60 30














  03/06/19 03/06/19 03/06/19





  10:15 14:31 20:20


 


Output, SARA #1 45 40 40














  03/07/19 03/07/19 03/07/19





  00:34 05:15 09:15


 


Output, SARA #1 25 20 25














  03/07/19 03/07/19





  13:15 17:19


 


Output, SARA #1 20 20











]








Assessment:


[Satisfactory post-op. ]








Plan:


[1. Drain requires further monitoring


2. Possible dc tomorrow


3 Encourage ambulation and OOB


4. Continue PO pain management]

## 2019-03-08 VITALS — DIASTOLIC BLOOD PRESSURE: 78 MMHG | SYSTOLIC BLOOD PRESSURE: 144 MMHG

## 2019-03-08 RX ADMIN — ALLOPURINOL SCH MG: 100 TABLET ORAL at 08:04

## 2019-03-08 RX ADMIN — LEVOTHYROXINE SODIUM SCH MCG: 25 TABLET ORAL at 05:37

## 2019-03-08 RX ADMIN — OXYCODONE HYDROCHLORIDE AND ACETAMINOPHEN PRN TAB: 5; 325 TABLET ORAL at 10:14

## 2019-03-08 RX ADMIN — NICOTINE SCH: 21 PATCH TRANSDERMAL at 08:04

## 2019-03-08 NOTE — PN
Progress Note





- Progress Note


Date of Service: 03/08/19


SOAP: 


Subjective:


[S/p lumbar decompression and discectomy POD #3


Pain well controlled with PO medication


Complains of mild LE pain since pre-op


Ambulating independently


Eating and drinking well


Denies headache, nausea.]








Objective:


[


Vital Signs:








Temp Pulse Resp BP Pulse Ox


 


 97.9 F   84   17   137/89   96 


 


 03/08/19 04:00  03/08/19 04:00  03/08/19 05:39  03/08/19 04:00  03/08/19 04:00











 Wound drain output








  03/05/19 03/05/19 03/05/19





  11:00 12:35 14:26


 


Output, SARA #1 70 90 90














  03/05/19 03/05/19 03/05/19





  19:21 19:49 22:10


 


Output, SARA #1 20 20 40














  03/06/19 03/06/19 03/06/19





  00:20 05:09 07:00


 


Output, SARA #1 20 60 30














  03/06/19 03/06/19 03/06/19





  10:15 14:31 20:20


 


Output, SARA #1 45 40 40














  03/07/19 03/07/19 03/07/19





  00:34 05:15 09:15


 


Output, SARA #1 25 20 25














  03/07/19 03/07/19 03/07/19





  13:15 17:19 21:09


 


Output, SARA #1 20 20 20














  03/08/19 03/08/19





  00:23 05:26


 


Output, SARA #1 20 20





General: Alert, sitting up in bed. NAD


Neuro: Motor and sensory intact


Incision:Incision intact with staples. Wound drain dc today. Nontender, no 

swelling or erythema.


]








Assessment:


[Satisfactory post-op]








Plan:


[1. Discharge home today


2. Discharge instructions discussed]